# Patient Record
(demographics unavailable — no encounter records)

---

## 2019-03-21 NOTE — DN
DATE OF PROCEDURE:  03/21/2019



The patient delivered a male infant at 37 weeks gestation on 03/21/2019 at 3:47 a.m.

by an uncomplicated term spontaneous vaginal delivery.  Apgars were 8 and 8.  Weight

was 2646 g.  Placenta delivered spontaneously followed by Pitocin infusion.

Estimated blood loss was 100 mL.  There were no lacerations.  Dr. Gregory is the

delivering physician.  Counts were correct.  Mother and baby were stable in the room

in the immediate postpartum. 







Job ID:  [id number]

## 2019-03-22 NOTE — PDOC.PP
Post Partum Progress Note


Post Partum Day #: 1


Subjective: 





Doing well this morning, no complaints.


PO intake tolerated: yes


Ambulation: yes


 Vital Signs (12 hours)











  Temp Pulse Resp BP Pulse Ox


 


 03/22/19 04:20  98.4 F  60  18  122/61  96


 


 03/22/19 00:45  98.4 F  75  16  106/76  97


 


 03/21/19 20:04  98.3 F  77  18  115/70  98








 Weight











Weight                         172 lb

















- Physical Examination


General: NAD


Respiratory: non-labored breathing


Abdominal: lochia (normal), no distention, appropriately TTP


Fundus firm & at: below umbilicus


Neurological: no gross focal deficits


Psychiatric: A&Ox3, normal affect


Result Diagrams: 


 03/20/19 22:15





Additional Labs: 


 Post Partum Labs











Blood Type  O POSITIVE   03/20/19  22:15    


 


Hep Bs Antigen  Non-Reactive S/CO (NonReactive)   03/20/19  22:15    











(1) Iron deficiency anemia due to chronic blood loss


Code(s): D50.0 - IRON DEFICIENCY ANEMIA SECONDARY TO BLOOD LOSS (CHRONIC)   

Status: Acute   





(2) Spontaneous vaginal delivery


Code(s): O80 - ENCOUNTER FOR FULL-TERM UNCOMPLICATED DELIVERY   Status: Acute   





(3) Teen pregnancy


Code(s): OOC6744 -    Status: Acute   





- Assessment/Plan





Would like to stay another day.  Case management consult pending.  Plan d/c 

tomorrow.